# Patient Record
Sex: MALE | Race: WHITE
[De-identification: names, ages, dates, MRNs, and addresses within clinical notes are randomized per-mention and may not be internally consistent; named-entity substitution may affect disease eponyms.]

---

## 2023-07-17 PROBLEM — Z00.00 ENCOUNTER FOR PREVENTIVE HEALTH EXAMINATION: Status: ACTIVE | Noted: 2023-07-17

## 2024-09-03 ENCOUNTER — APPOINTMENT (OUTPATIENT)
Dept: DERMATOLOGY | Facility: CLINIC | Age: 64
End: 2024-09-03
Payer: COMMERCIAL

## 2024-09-03 PROCEDURE — 99203 OFFICE O/P NEW LOW 30 MIN: CPT | Mod: 25

## 2024-09-03 PROCEDURE — 17110 DESTRUCTION B9 LES UP TO 14: CPT

## 2025-07-05 ENCOUNTER — INPATIENT (INPATIENT)
Facility: HOSPITAL | Age: 65
LOS: 1 days | Discharge: HOME | DRG: 189 | End: 2025-07-07
Attending: STUDENT IN AN ORGANIZED HEALTH CARE EDUCATION/TRAINING PROGRAM | Admitting: STUDENT IN AN ORGANIZED HEALTH CARE EDUCATION/TRAINING PROGRAM
Payer: COMMERCIAL

## 2025-07-05 VITALS
OXYGEN SATURATION: 93 % | DIASTOLIC BLOOD PRESSURE: 84 MMHG | TEMPERATURE: 98 F | RESPIRATION RATE: 18 BRPM | HEIGHT: 72 IN | SYSTOLIC BLOOD PRESSURE: 130 MMHG | WEIGHT: 224.65 LBS | HEART RATE: 95 BPM

## 2025-07-05 DIAGNOSIS — J96.01 ACUTE RESPIRATORY FAILURE WITH HYPOXIA: ICD-10-CM

## 2025-07-05 LAB
ALBUMIN SERPL ELPH-MCNC: 4.2 G/DL — SIGNIFICANT CHANGE UP (ref 3.3–5.2)
ALP SERPL-CCNC: 57 U/L — SIGNIFICANT CHANGE UP (ref 40–120)
ALT FLD-CCNC: 31 U/L — SIGNIFICANT CHANGE UP
ANION GAP SERPL CALC-SCNC: 16 MMOL/L — SIGNIFICANT CHANGE UP (ref 5–17)
AST SERPL-CCNC: 23 U/L — SIGNIFICANT CHANGE UP
BILIRUB SERPL-MCNC: 0.8 MG/DL — SIGNIFICANT CHANGE UP (ref 0.4–2)
BUN SERPL-MCNC: 16.6 MG/DL — SIGNIFICANT CHANGE UP (ref 8–20)
CALCIUM SERPL-MCNC: 9.3 MG/DL — SIGNIFICANT CHANGE UP (ref 8.4–10.5)
CHLORIDE SERPL-SCNC: 105 MMOL/L — SIGNIFICANT CHANGE UP (ref 96–108)
CO2 SERPL-SCNC: 21 MMOL/L — LOW (ref 22–29)
CREAT SERPL-MCNC: 0.74 MG/DL — SIGNIFICANT CHANGE UP (ref 0.5–1.3)
D DIMER BLD IA.RAPID-MCNC: <150 NG/ML DDU — SIGNIFICANT CHANGE UP
EGFR: 102 ML/MIN/1.73M2 — SIGNIFICANT CHANGE UP
EGFR: 102 ML/MIN/1.73M2 — SIGNIFICANT CHANGE UP
GAS PNL BLDV: SIGNIFICANT CHANGE UP
GLUCOSE SERPL-MCNC: 92 MG/DL — SIGNIFICANT CHANGE UP (ref 70–99)
HCT VFR BLD CALC: 46 % — SIGNIFICANT CHANGE UP (ref 39–50)
HGB BLD-MCNC: 15.8 G/DL — SIGNIFICANT CHANGE UP (ref 13–17)
MCHC RBC-ENTMCNC: 32 PG — SIGNIFICANT CHANGE UP (ref 27–34)
MCHC RBC-ENTMCNC: 34.3 G/DL — SIGNIFICANT CHANGE UP (ref 32–36)
MCV RBC AUTO: 93.3 FL — SIGNIFICANT CHANGE UP (ref 80–100)
NRBC # BLD AUTO: 0 K/UL — SIGNIFICANT CHANGE UP (ref 0–0)
NRBC # FLD: 0 K/UL — SIGNIFICANT CHANGE UP (ref 0–0)
NRBC BLD AUTO-RTO: 0 /100 WBCS — SIGNIFICANT CHANGE UP (ref 0–0)
NT-PROBNP SERPL-SCNC: <36 PG/ML — SIGNIFICANT CHANGE UP (ref 0–300)
PLATELET # BLD AUTO: 268 K/UL — SIGNIFICANT CHANGE UP (ref 150–400)
PMV BLD: 10.5 FL — SIGNIFICANT CHANGE UP (ref 7–13)
POTASSIUM SERPL-MCNC: 4 MMOL/L — SIGNIFICANT CHANGE UP (ref 3.5–5.3)
POTASSIUM SERPL-SCNC: 4 MMOL/L — SIGNIFICANT CHANGE UP (ref 3.5–5.3)
PROT SERPL-MCNC: 6.9 G/DL — SIGNIFICANT CHANGE UP (ref 6.6–8.7)
RAPID RVP RESULT: SIGNIFICANT CHANGE UP
RBC # BLD: 4.93 M/UL — SIGNIFICANT CHANGE UP (ref 4.2–5.8)
RBC # FLD: 12.8 % — SIGNIFICANT CHANGE UP (ref 10.3–14.5)
SARS-COV-2 RNA SPEC QL NAA+PROBE: SIGNIFICANT CHANGE UP
SODIUM SERPL-SCNC: 142 MMOL/L — SIGNIFICANT CHANGE UP (ref 135–145)
TROPONIN T, HIGH SENSITIVITY RESULT: 11 NG/L — SIGNIFICANT CHANGE UP (ref 0–51)
TROPONIN T, HIGH SENSITIVITY RESULT: 13 NG/L — SIGNIFICANT CHANGE UP (ref 0–51)
WBC # BLD: 8.04 K/UL — SIGNIFICANT CHANGE UP (ref 3.8–10.5)
WBC # FLD AUTO: 8.04 K/UL — SIGNIFICANT CHANGE UP (ref 3.8–10.5)

## 2025-07-05 PROCEDURE — 84295 ASSAY OF SERUM SODIUM: CPT

## 2025-07-05 PROCEDURE — 82435 ASSAY OF BLOOD CHLORIDE: CPT

## 2025-07-05 PROCEDURE — 84132 ASSAY OF SERUM POTASSIUM: CPT

## 2025-07-05 PROCEDURE — 0225U NFCT DS DNA&RNA 21 SARSCOV2: CPT

## 2025-07-05 PROCEDURE — 80053 COMPREHEN METABOLIC PANEL: CPT

## 2025-07-05 PROCEDURE — 85027 COMPLETE CBC AUTOMATED: CPT

## 2025-07-05 PROCEDURE — 85379 FIBRIN DEGRADATION QUANT: CPT

## 2025-07-05 PROCEDURE — 83605 ASSAY OF LACTIC ACID: CPT

## 2025-07-05 PROCEDURE — 94640 AIRWAY INHALATION TREATMENT: CPT

## 2025-07-05 PROCEDURE — 71046 X-RAY EXAM CHEST 2 VIEWS: CPT

## 2025-07-05 PROCEDURE — 99285 EMERGENCY DEPT VISIT HI MDM: CPT

## 2025-07-05 PROCEDURE — 82803 BLOOD GASES ANY COMBINATION: CPT

## 2025-07-05 PROCEDURE — 71275 CT ANGIOGRAPHY CHEST: CPT | Mod: 26

## 2025-07-05 PROCEDURE — 85014 HEMATOCRIT: CPT

## 2025-07-05 PROCEDURE — 36415 COLL VENOUS BLD VENIPUNCTURE: CPT

## 2025-07-05 PROCEDURE — 83880 ASSAY OF NATRIURETIC PEPTIDE: CPT

## 2025-07-05 PROCEDURE — 82330 ASSAY OF CALCIUM: CPT

## 2025-07-05 PROCEDURE — 71275 CT ANGIOGRAPHY CHEST: CPT

## 2025-07-05 PROCEDURE — 71046 X-RAY EXAM CHEST 2 VIEWS: CPT | Mod: 26

## 2025-07-05 PROCEDURE — 85018 HEMOGLOBIN: CPT

## 2025-07-05 PROCEDURE — 84484 ASSAY OF TROPONIN QUANT: CPT

## 2025-07-05 PROCEDURE — 82947 ASSAY GLUCOSE BLOOD QUANT: CPT

## 2025-07-05 PROCEDURE — 99223 1ST HOSP IP/OBS HIGH 75: CPT

## 2025-07-05 RX ORDER — CEFTRIAXONE 500 MG/1
1000 INJECTION, POWDER, FOR SOLUTION INTRAMUSCULAR; INTRAVENOUS ONCE
Refills: 0 | Status: COMPLETED | OUTPATIENT
Start: 2025-07-05 | End: 2025-07-05

## 2025-07-05 RX ORDER — CEFTRIAXONE 500 MG/1
1000 INJECTION, POWDER, FOR SOLUTION INTRAMUSCULAR; INTRAVENOUS ONCE
Refills: 0 | Status: DISCONTINUED | OUTPATIENT
Start: 2025-07-05 | End: 2025-07-05

## 2025-07-05 RX ORDER — MELATONIN 5 MG
3 TABLET ORAL AT BEDTIME
Refills: 0 | Status: DISCONTINUED | OUTPATIENT
Start: 2025-07-05 | End: 2025-07-07

## 2025-07-05 RX ORDER — METHYLPREDNISOLONE ACETATE 80 MG/ML
125 INJECTION, SUSPENSION INTRA-ARTICULAR; INTRALESIONAL; INTRAMUSCULAR; SOFT TISSUE ONCE
Refills: 0 | Status: COMPLETED | OUTPATIENT
Start: 2025-07-05 | End: 2025-07-05

## 2025-07-05 RX ORDER — ONDANSETRON HCL/PF 4 MG/2 ML
4 VIAL (ML) INJECTION EVERY 6 HOURS
Refills: 0 | Status: DISCONTINUED | OUTPATIENT
Start: 2025-07-05 | End: 2025-07-07

## 2025-07-05 RX ORDER — METHYLPREDNISOLONE ACETATE 80 MG/ML
40 INJECTION, SUSPENSION INTRA-ARTICULAR; INTRALESIONAL; INTRAMUSCULAR; SOFT TISSUE EVERY 8 HOURS
Refills: 0 | Status: DISCONTINUED | OUTPATIENT
Start: 2025-07-05 | End: 2025-07-07

## 2025-07-05 RX ORDER — MAGNESIUM, ALUMINUM HYDROXIDE 200-200 MG
30 TABLET,CHEWABLE ORAL EVERY 4 HOURS
Refills: 0 | Status: DISCONTINUED | OUTPATIENT
Start: 2025-07-05 | End: 2025-07-07

## 2025-07-05 RX ORDER — ACETAMINOPHEN 500 MG/5ML
650 LIQUID (ML) ORAL EVERY 6 HOURS
Refills: 0 | Status: DISCONTINUED | OUTPATIENT
Start: 2025-07-05 | End: 2025-07-07

## 2025-07-05 RX ORDER — IPRATROPIUM BROMIDE AND ALBUTEROL SULFATE .5; 2.5 MG/3ML; MG/3ML
3 SOLUTION RESPIRATORY (INHALATION) EVERY 6 HOURS
Refills: 0 | Status: DISCONTINUED | OUTPATIENT
Start: 2025-07-05 | End: 2025-07-05

## 2025-07-05 RX ORDER — IPRATROPIUM BROMIDE AND ALBUTEROL SULFATE .5; 2.5 MG/3ML; MG/3ML
3 SOLUTION RESPIRATORY (INHALATION) EVERY 6 HOURS
Refills: 0 | Status: DISCONTINUED | OUTPATIENT
Start: 2025-07-05 | End: 2025-07-07

## 2025-07-05 RX ORDER — AZITHROMYCIN 250 MG
500 CAPSULE ORAL ONCE
Refills: 0 | Status: COMPLETED | OUTPATIENT
Start: 2025-07-05 | End: 2025-07-05

## 2025-07-05 RX ADMIN — CEFTRIAXONE 1000 MILLIGRAM(S): 500 INJECTION, POWDER, FOR SOLUTION INTRAMUSCULAR; INTRAVENOUS at 20:55

## 2025-07-05 RX ADMIN — IPRATROPIUM BROMIDE AND ALBUTEROL SULFATE 3 MILLILITER(S): .5; 2.5 SOLUTION RESPIRATORY (INHALATION) at 15:52

## 2025-07-05 RX ADMIN — Medication 250 MILLIGRAM(S): at 20:56

## 2025-07-05 RX ADMIN — METHYLPREDNISOLONE ACETATE 125 MILLIGRAM(S): 80 INJECTION, SUSPENSION INTRA-ARTICULAR; INTRALESIONAL; INTRAMUSCULAR; SOFT TISSUE at 15:48

## 2025-07-05 NOTE — ED PROVIDER NOTE - PHYSICAL EXAMINATION
GENERAL: no acute distress, comfortably in bed  HEENT: Atraumatic, normocephalic, non-icteric, no JVD  NEURO:  A&Ox3, no focal deficits, moving all extremities spontaneously, no dysarthria, CN II-XII grossly intact  PSYCH: Normal affect, calm, appropriate insight and judgment, fluent speech  LUNGS:wheezinfg bl no distress   HEART: RRR, no murmur appreciated  ABD: Soft, non-tender, non-distended, no organomegaly, no appreciable masses, +bs all 4 quadrants  EXTREMITIES: Nontender, no clubbing, cyanosis, or edema  SKIN: No rashes or lesions
no

## 2025-07-05 NOTE — H&P ADULT - HISTORY OF PRESENT ILLNESS
65yoM hx former smoker, early stage bladder cancer, HTN, HLD presenting with 1.5 weeks of exertional dyspnea, productive cough and audible wheezing.  Pt was recently diagnosed with a hMPV as outpatient which coincide with onset of symptoms. He was started on budesonide nebulizer by an outpatient doctor which offered temporary relief but came to ED as symptoms were persistent.  Pt was not given any oral steroids or antibiotics as outpatient.  He denies hx of any chronic lung diseases, never seen a pulmonologist or had any PFTs done.  On admission, pt hypoxic to 86% on room air and placed on nasal cannula with improvement.    65yoM hx former smoker, early stage bladder cancer, HTN, HLD presenting with 1.5 weeks of exertional dyspnea, productive cough and audible wheezing.  Pt was recently diagnosed with a hMPV as outpatient which coincided with onset of symptoms. He was started on budesonide nebulizer by an outpatient doctor which offered temporary relief but came to ED as symptoms were persistent.  Pt was not given any oral steroids or antibiotics by outpatient doctor.  He denies hx of any chronic lung diseases, never seen a pulmonologist or had any PFTs done.  On admission, pt hypoxic to 86% on room air and placed on nasal cannula with improvement.

## 2025-07-05 NOTE — ED PROVIDER NOTE - CLINICAL SUMMARY MEDICAL DECISION MAKING FREE TEXT BOX
pt with wheezing sob for 2 days recently treated for pna hypoxic on room air to be admiited to medicine

## 2025-07-05 NOTE — H&P ADULT - CONVERSATION DETAILS
Pt w/ early bladder cancer diagnosis, currently undergoing close follow up with urologist with cautery of lesions as needed.  Pt states he wants aggressive medical management and resuscitative measures in event of cardiac arrest. Pt is full code.

## 2025-07-05 NOTE — H&P ADULT - TIME BILLING
chart review, patient encounter, chart documentation, GOC discussion.  Plan discussed with patient and floor RN.

## 2025-07-05 NOTE — H&P ADULT - NSICDXPASTMEDICALHX_GEN_ALL_CORE_FT
PAST MEDICAL HISTORY:  Bladder cancer     Former cigarette smoker     Hyperlipidemia     Hypertension

## 2025-07-05 NOTE — H&P ADULT - NSHPPOAPULMEMBOLUS_GEN_A_CORE
Procedure:  COLONOSCOPY    Relevant Problems   No relevant active problems        Physical Exam    Airway    Mallampati score: II  TM Distance: >3 FB  Neck ROM: full     Dental       Cardiovascular  Rhythm: regular, Rate: normal, Cardiovascular exam normal    Pulmonary  Pulmonary exam normal Breath sounds clear to auscultation,     Other Findings        Anesthesia Plan  ASA Score- 1     Anesthesia Type- IV sedation with anesthesia with ASA Monitors  Additional Monitors:   Airway Plan:           Plan Factors-Exercise tolerance (METS): >4 METS  Chart reviewed  Patient summary reviewed  Patient is not a current smoker  Induction-     Postoperative Plan-     Informed Consent- Anesthetic plan and risks discussed with patient  no

## 2025-07-05 NOTE — ED ADULT TRIAGE NOTE - CHIEF COMPLAINT QUOTE
pt c/o SOB, DX bronchitis a few days ago, got worse last week, maybe PNA  A&Ox3, resp wnl, denies fever

## 2025-07-05 NOTE — H&P ADULT - NSHPLABSRESULTS_GEN_ALL_CORE
07-06    139  |  104  |  18.1  ----------------------------<  142[H]  3.9   |  20.0[L]  |  0.74    Ca    9.2      06 Jul 2025 02:00    TPro  7.2  /  Alb  4.5  /  TBili  0.6  /  DBili  x   /  AST  19  /  ALT  30  /  AlkPhos  55  07-06                            15.5   10.21 )-----------( 272      ( 06 Jul 2025 02:00 )             44.7

## 2025-07-05 NOTE — H&P ADULT - ASSESSMENT
65yoM hx former smoker, early stage bladder cancer, HTN, HLD presenting with several days of exertional dyspnea, productive cough and audible wheezing after he was diagnosed with recent hMPV as outpatient     Acute hypoxemic respiratory failure  Suspected bronchitis from recent viral infection +/- probable COPD exacerbation   -CT chest reviewed, no consolidation, but bronchial thickening noted  -s/p ceftriaxone, azithromycin by ED  -Continue CAP therapy while awaiting inflammatory markers, urine Legionella + Strep  -Continue IV steroids and standing DuoNebs  -Obtain MRSA swab, blood cultures  -On 3L nasal cannula  -Admit to , wean supplemental O2 as tolerated   -Needs outpatient pulmonary follow up for PFTs    Hx HTN, HLD  -Resume amlodipine, interchange Olmesartan w/ losartan  -Resume rosuvastatin     Hx bladder cancer  -Reports ‘stage 0’ bladder cancer, on surveillance with cautery of lesions  -Outpatient urology follow up    Hx adrenal lesions  -Incidental finding on CT chest but pt states has been following for 4-5 years w/ {<D    Hx nephrolithiasis  -Bilateral non-obstructing renal calculi on CT, pt aware, follows w/ urologist     Hx obesity  -On Zepbound weekly for weight loss, hold in hospital     Prophylactic measure  -Lovenox 40mg daily (higher risk due to malignancy)        Dispo: medically active, pending clinical improvement, wean off supplemental O2.  Estimated LOS: 2-3 days, likely d/c to home.

## 2025-07-05 NOTE — ED ADULT NURSE REASSESSMENT NOTE - NS ED NURSE REASSESS COMMENT FT1
Assumed care of patient at 1930, resting on stretcher in no acute distress.  O2 via N/C in progress with v/s as charted, with MD aware.  Pt is admitted to tele/ under medicine service and currently awaiting bed.  No complaints voiced, no adverse reactions to antibiotics noted.

## 2025-07-05 NOTE — H&P ADULT - NSHPPHYSICALEXAM_GEN_ALL_CORE
Vital Signs Last 24 Hrs  T(C): 36.3 (06 Jul 2025 05:00), Max: 37.1 (05 Jul 2025 15:14)  T(F): 97.4 (06 Jul 2025 05:00), Max: 98.8 (05 Jul 2025 22:50)  HR: 104 (06 Jul 2025 05:00) (95 - 119)  BP: 108/66 (06 Jul 2025 05:00) (101/60 - 164/93)  BP(mean): 102 (05 Jul 2025 23:14) (102 - 106)  RR: 18 (06 Jul 2025 05:00) (16 - 23)  SpO2: 93% (06 Jul 2025 05:00) (86% - 95%)    Parameters below as of 06 Jul 2025 05:00  Patient On (Oxygen Delivery Method): nasal cannula  O2 Flow (L/min): 3    GENERAL:  Well-appearing, not in acute distress, wearing nasal cannula  EYES:  Clear conjunctiva, extraocular movement intact  ENT: Moist mucous membranes  RESP:  Non-labored breathing pattern, bilateral wheezing, rhonchi  CV: Regular rate and rhythm, no murmurs appreciated, no lower extremity edema  GI: Soft, non-tender, non-distended  NEURO: Awake, alert, conversant, upper and lower extremity strength 5/5, light touch sensation grossly intact  PSYCH: Calm, cooperative  SKIN: No rash or lesions, warm and dry

## 2025-07-05 NOTE — ED ADULT NURSE NOTE - OBJECTIVE STATEMENT
Pt is a 65y M, AOx4, c/o SOB and productive cough. Pt states he was dx w/bronchitis by PCP x2 weeks ago, has been on azithromycin w/no relief of sx. Pt has prior Xray from PCP. No hx COPD, hx smoking, not currently smoker. Pt denies chest pain, abd pain, back pain, diaphoresis, headaches, dizziness, lightheadedness, fevers, chills, n/v/d, constipation and dysuria. PMH HTN. WOB noted, pt unable to speak full sentences without becoming exhausted, o2 sat at 95% on RA, airway patent. Pt remains on tele monitor in NSR w/. Bed locked and in lowest position.

## 2025-07-06 DIAGNOSIS — Z90.89 ACQUIRED ABSENCE OF OTHER ORGANS: Chronic | ICD-10-CM

## 2025-07-06 DIAGNOSIS — Z98.890 OTHER SPECIFIED POSTPROCEDURAL STATES: Chronic | ICD-10-CM

## 2025-07-06 LAB
ALBUMIN SERPL ELPH-MCNC: 4.5 G/DL — SIGNIFICANT CHANGE UP (ref 3.3–5.2)
ALP SERPL-CCNC: 55 U/L — SIGNIFICANT CHANGE UP (ref 40–120)
ALT FLD-CCNC: 30 U/L — SIGNIFICANT CHANGE UP
ANION GAP SERPL CALC-SCNC: 16 MMOL/L — SIGNIFICANT CHANGE UP (ref 5–17)
AST SERPL-CCNC: 19 U/L — SIGNIFICANT CHANGE UP
BASOPHILS # BLD AUTO: 0.02 K/UL — SIGNIFICANT CHANGE UP (ref 0–0.2)
BASOPHILS NFR BLD AUTO: 0.2 % — SIGNIFICANT CHANGE UP (ref 0–2)
BILIRUB SERPL-MCNC: 0.6 MG/DL — SIGNIFICANT CHANGE UP (ref 0.4–2)
BUN SERPL-MCNC: 18.1 MG/DL — SIGNIFICANT CHANGE UP (ref 8–20)
CALCIUM SERPL-MCNC: 9.2 MG/DL — SIGNIFICANT CHANGE UP (ref 8.4–10.5)
CHLORIDE SERPL-SCNC: 104 MMOL/L — SIGNIFICANT CHANGE UP (ref 96–108)
CO2 SERPL-SCNC: 20 MMOL/L — LOW (ref 22–29)
CREAT SERPL-MCNC: 0.74 MG/DL — SIGNIFICANT CHANGE UP (ref 0.5–1.3)
CRP SERPL-MCNC: <4 MG/L — SIGNIFICANT CHANGE UP
EGFR: 102 ML/MIN/1.73M2 — SIGNIFICANT CHANGE UP
EGFR: 102 ML/MIN/1.73M2 — SIGNIFICANT CHANGE UP
EOSINOPHIL # BLD AUTO: 0 K/UL — SIGNIFICANT CHANGE UP (ref 0–0.5)
EOSINOPHIL NFR BLD AUTO: 0 % — SIGNIFICANT CHANGE UP (ref 0–6)
GLUCOSE SERPL-MCNC: 142 MG/DL — HIGH (ref 70–99)
GRAM STN FLD: ABNORMAL
HCT VFR BLD CALC: 44.7 % — SIGNIFICANT CHANGE UP (ref 39–50)
HGB BLD-MCNC: 15.5 G/DL — SIGNIFICANT CHANGE UP (ref 13–17)
IMM GRANULOCYTES # BLD AUTO: 0.04 K/UL — SIGNIFICANT CHANGE UP (ref 0–0.07)
IMM GRANULOCYTES NFR BLD AUTO: 0.4 % — SIGNIFICANT CHANGE UP (ref 0–0.9)
LEGIONELLA AG UR QL: NEGATIVE — SIGNIFICANT CHANGE UP
LYMPHOCYTES # BLD AUTO: 0.89 K/UL — LOW (ref 1–3.3)
LYMPHOCYTES NFR BLD AUTO: 8.7 % — LOW (ref 13–44)
MCHC RBC-ENTMCNC: 31.9 PG — SIGNIFICANT CHANGE UP (ref 27–34)
MCHC RBC-ENTMCNC: 34.7 G/DL — SIGNIFICANT CHANGE UP (ref 32–36)
MCV RBC AUTO: 92 FL — SIGNIFICANT CHANGE UP (ref 80–100)
MONOCYTES # BLD AUTO: 0.13 K/UL — SIGNIFICANT CHANGE UP (ref 0–0.9)
MONOCYTES NFR BLD AUTO: 1.3 % — LOW (ref 2–14)
MRSA PCR RESULT.: SIGNIFICANT CHANGE UP
NEUTROPHILS # BLD AUTO: 9.13 K/UL — HIGH (ref 1.8–7.4)
NEUTROPHILS NFR BLD AUTO: 89.4 % — HIGH (ref 43–77)
NRBC # BLD AUTO: 0 K/UL — SIGNIFICANT CHANGE UP (ref 0–0)
NRBC # FLD: 0 K/UL — SIGNIFICANT CHANGE UP (ref 0–0)
NRBC BLD AUTO-RTO: 0 /100 WBCS — SIGNIFICANT CHANGE UP (ref 0–0)
PLATELET # BLD AUTO: 272 K/UL — SIGNIFICANT CHANGE UP (ref 150–400)
PMV BLD: 10.1 FL — SIGNIFICANT CHANGE UP (ref 7–13)
POTASSIUM SERPL-MCNC: 3.9 MMOL/L — SIGNIFICANT CHANGE UP (ref 3.5–5.3)
POTASSIUM SERPL-SCNC: 3.9 MMOL/L — SIGNIFICANT CHANGE UP (ref 3.5–5.3)
PROCALCITONIN SERPL-MCNC: 0.05 NG/ML — SIGNIFICANT CHANGE UP (ref 0.02–0.1)
PROT SERPL-MCNC: 7.2 G/DL — SIGNIFICANT CHANGE UP (ref 6.6–8.7)
RBC # BLD: 4.86 M/UL — SIGNIFICANT CHANGE UP (ref 4.2–5.8)
RBC # FLD: 12.4 % — SIGNIFICANT CHANGE UP (ref 10.3–14.5)
S AUREUS DNA NOSE QL NAA+PROBE: SIGNIFICANT CHANGE UP
S PNEUM AG UR QL: NEGATIVE — SIGNIFICANT CHANGE UP
SODIUM SERPL-SCNC: 139 MMOL/L — SIGNIFICANT CHANGE UP (ref 135–145)
SPECIMEN SOURCE: SIGNIFICANT CHANGE UP
WBC # BLD: 10.21 K/UL — SIGNIFICANT CHANGE UP (ref 3.8–10.5)
WBC # FLD AUTO: 10.21 K/UL — SIGNIFICANT CHANGE UP (ref 3.8–10.5)

## 2025-07-06 PROCEDURE — 87641 MR-STAPH DNA AMP PROBE: CPT

## 2025-07-06 PROCEDURE — 71046 X-RAY EXAM CHEST 2 VIEWS: CPT

## 2025-07-06 PROCEDURE — 82947 ASSAY GLUCOSE BLOOD QUANT: CPT

## 2025-07-06 PROCEDURE — 84132 ASSAY OF SERUM POTASSIUM: CPT

## 2025-07-06 PROCEDURE — 85025 COMPLETE CBC W/AUTO DIFF WBC: CPT

## 2025-07-06 PROCEDURE — 94640 AIRWAY INHALATION TREATMENT: CPT

## 2025-07-06 PROCEDURE — 87899 AGENT NOS ASSAY W/OPTIC: CPT

## 2025-07-06 PROCEDURE — 85018 HEMOGLOBIN: CPT

## 2025-07-06 PROCEDURE — 80053 COMPREHEN METABOLIC PANEL: CPT

## 2025-07-06 PROCEDURE — 87640 STAPH A DNA AMP PROBE: CPT

## 2025-07-06 PROCEDURE — 85027 COMPLETE CBC AUTOMATED: CPT

## 2025-07-06 PROCEDURE — 87070 CULTURE OTHR SPECIMN AEROBIC: CPT

## 2025-07-06 PROCEDURE — 71275 CT ANGIOGRAPHY CHEST: CPT

## 2025-07-06 PROCEDURE — 36415 COLL VENOUS BLD VENIPUNCTURE: CPT

## 2025-07-06 PROCEDURE — 82803 BLOOD GASES ANY COMBINATION: CPT

## 2025-07-06 PROCEDURE — 0225U NFCT DS DNA&RNA 21 SARSCOV2: CPT

## 2025-07-06 PROCEDURE — 84295 ASSAY OF SERUM SODIUM: CPT

## 2025-07-06 PROCEDURE — 82435 ASSAY OF BLOOD CHLORIDE: CPT

## 2025-07-06 PROCEDURE — 99233 SBSQ HOSP IP/OBS HIGH 50: CPT

## 2025-07-06 PROCEDURE — 85014 HEMATOCRIT: CPT

## 2025-07-06 PROCEDURE — 85379 FIBRIN DEGRADATION QUANT: CPT

## 2025-07-06 PROCEDURE — 84145 PROCALCITONIN (PCT): CPT

## 2025-07-06 PROCEDURE — 87040 BLOOD CULTURE FOR BACTERIA: CPT

## 2025-07-06 PROCEDURE — 86140 C-REACTIVE PROTEIN: CPT

## 2025-07-06 PROCEDURE — 83880 ASSAY OF NATRIURETIC PEPTIDE: CPT

## 2025-07-06 PROCEDURE — 82330 ASSAY OF CALCIUM: CPT

## 2025-07-06 PROCEDURE — 84484 ASSAY OF TROPONIN QUANT: CPT

## 2025-07-06 PROCEDURE — 83605 ASSAY OF LACTIC ACID: CPT

## 2025-07-06 RX ORDER — ROSUVASTATIN CALCIUM 20 MG/1
20 TABLET, FILM COATED ORAL AT BEDTIME
Refills: 0 | Status: DISCONTINUED | OUTPATIENT
Start: 2025-07-06 | End: 2025-07-07

## 2025-07-06 RX ORDER — DEXTROMETHORPHAN HBR, GUAIFENESIN 200 MG/10ML
100 LIQUID ORAL EVERY 6 HOURS
Refills: 0 | Status: DISCONTINUED | OUTPATIENT
Start: 2025-07-06 | End: 2025-07-07

## 2025-07-06 RX ORDER — BUDESONIDE 0.25 MG/2ML
2 SUSPENSION RESPIRATORY (INHALATION)
Refills: 0 | DISCHARGE

## 2025-07-06 RX ORDER — CEFTRIAXONE 500 MG/1
1000 INJECTION, POWDER, FOR SOLUTION INTRAMUSCULAR; INTRAVENOUS EVERY 24 HOURS
Refills: 0 | Status: DISCONTINUED | OUTPATIENT
Start: 2025-07-06 | End: 2025-07-06

## 2025-07-06 RX ORDER — OLMESARTAN MEDOXOMIL 5 MG/1
1 TABLET, FILM COATED ORAL
Refills: 0 | DISCHARGE

## 2025-07-06 RX ORDER — AZITHROMYCIN 250 MG
500 CAPSULE ORAL EVERY 24 HOURS
Refills: 0 | Status: DISCONTINUED | OUTPATIENT
Start: 2025-07-06 | End: 2025-07-07

## 2025-07-06 RX ORDER — LOSARTAN POTASSIUM 100 MG/1
100 TABLET, FILM COATED ORAL DAILY
Refills: 0 | Status: DISCONTINUED | OUTPATIENT
Start: 2025-07-06 | End: 2025-07-07

## 2025-07-06 RX ORDER — AMLODIPINE BESYLATE 10 MG/1
1 TABLET ORAL
Refills: 0 | DISCHARGE

## 2025-07-06 RX ORDER — AMLODIPINE BESYLATE 10 MG/1
2.5 TABLET ORAL DAILY
Refills: 0 | Status: DISCONTINUED | OUTPATIENT
Start: 2025-07-06 | End: 2025-07-07

## 2025-07-06 RX ORDER — ENOXAPARIN SODIUM 100 MG/ML
40 INJECTION SUBCUTANEOUS EVERY 24 HOURS
Refills: 0 | Status: DISCONTINUED | OUTPATIENT
Start: 2025-07-06 | End: 2025-07-07

## 2025-07-06 RX ORDER — DEXTROMETHORPHAN HBR, GUAIFENESIN 200 MG/10ML
1200 LIQUID ORAL EVERY 12 HOURS
Refills: 0 | Status: DISCONTINUED | OUTPATIENT
Start: 2025-07-06 | End: 2025-07-07

## 2025-07-06 RX ADMIN — DEXTROMETHORPHAN HBR, GUAIFENESIN 1200 MILLIGRAM(S): 200 LIQUID ORAL at 17:01

## 2025-07-06 RX ADMIN — ENOXAPARIN SODIUM 40 MILLIGRAM(S): 100 INJECTION SUBCUTANEOUS at 05:14

## 2025-07-06 RX ADMIN — METHYLPREDNISOLONE ACETATE 40 MILLIGRAM(S): 80 INJECTION, SUSPENSION INTRA-ARTICULAR; INTRALESIONAL; INTRAMUSCULAR; SOFT TISSUE at 05:15

## 2025-07-06 RX ADMIN — IPRATROPIUM BROMIDE AND ALBUTEROL SULFATE 3 MILLILITER(S): .5; 2.5 SOLUTION RESPIRATORY (INHALATION) at 15:30

## 2025-07-06 RX ADMIN — METHYLPREDNISOLONE ACETATE 40 MILLIGRAM(S): 80 INJECTION, SUSPENSION INTRA-ARTICULAR; INTRALESIONAL; INTRAMUSCULAR; SOFT TISSUE at 22:07

## 2025-07-06 RX ADMIN — METHYLPREDNISOLONE ACETATE 40 MILLIGRAM(S): 80 INJECTION, SUSPENSION INTRA-ARTICULAR; INTRALESIONAL; INTRAMUSCULAR; SOFT TISSUE at 13:41

## 2025-07-06 RX ADMIN — AMLODIPINE BESYLATE 2.5 MILLIGRAM(S): 10 TABLET ORAL at 05:15

## 2025-07-06 RX ADMIN — LOSARTAN POTASSIUM 100 MILLIGRAM(S): 100 TABLET, FILM COATED ORAL at 05:15

## 2025-07-06 RX ADMIN — ROSUVASTATIN CALCIUM 20 MILLIGRAM(S): 20 TABLET, FILM COATED ORAL at 22:07

## 2025-07-06 RX ADMIN — Medication 250 MILLIGRAM(S): at 05:15

## 2025-07-06 RX ADMIN — IPRATROPIUM BROMIDE AND ALBUTEROL SULFATE 3 MILLILITER(S): .5; 2.5 SOLUTION RESPIRATORY (INHALATION) at 03:06

## 2025-07-06 RX ADMIN — Medication 40 MILLIGRAM(S): at 05:17

## 2025-07-06 RX ADMIN — IPRATROPIUM BROMIDE AND ALBUTEROL SULFATE 3 MILLILITER(S): .5; 2.5 SOLUTION RESPIRATORY (INHALATION) at 22:49

## 2025-07-06 RX ADMIN — DEXTROMETHORPHAN HBR, GUAIFENESIN 100 MILLIGRAM(S): 200 LIQUID ORAL at 23:30

## 2025-07-06 RX ADMIN — CEFTRIAXONE 1000 MILLIGRAM(S): 500 INJECTION, POWDER, FOR SOLUTION INTRAMUSCULAR; INTRAVENOUS at 05:15

## 2025-07-06 RX ADMIN — IPRATROPIUM BROMIDE AND ALBUTEROL SULFATE 3 MILLILITER(S): .5; 2.5 SOLUTION RESPIRATORY (INHALATION) at 09:31

## 2025-07-06 NOTE — PROGRESS NOTE ADULT - SUBJECTIVE AND OBJECTIVE BOX
Patient is a 65y old male who presents with a chief complaint of Acute hypoxemic respiratory failure  Acute bronchitis, suspected COPD exacerbation (05 Jul 2025 22:47)    BRIEF HOSPITAL COURSE:  Patient is a 65 year old male with a PMHx of former smoker, early stage bladder cancer, HTN, and HLD. Pt presented to the ED on 7/5 afternoon with c/o 1.5 weeks of exertional dyspnea, productive cough, audible wheezing, and mild headache. Pt was seen outpatient and diagnosed with human metapneumovirus approximately 1.5 weeks ago which coincided with the onset of his symptoms. He was started on budesonide nebulizer by an outpatient provider which offered temporary relief. He came to the ED due to persistent symptoms. He denies hx of any chronic lung diseases and has never seen a pulmonologist or had any PFTs done.  On admission, patient was found to be hypoxic with an oxygen saturation of 86% on room air. He was placed on nasal cannula and showed improvement.     Chest X-ray on 7/5 was negative for active chest disease,     CT Chest on 7/5 showed b/l lower lobe bronchial wall thickening with areas of mucoid impaction and, to a lesser degree, in the right middle lobe. Incidental findings of b/l non-obstructing renal calculi and a 3.3 cm indeterminate left adrenal gland nodule.       INTERVAL HPI/OVERNIGHT EVENTS:  - No acute overnight events  - Patient is comfortable    TODAY:  - Patient examined bedside, comfortable, no complaints  - Patient denies CP, SOB, fever, nausea, vomiting  - Patient states that he does not feel a difference with or without the nasal cannula  - Patient is occasionally coughing, productive with light yellow phlegm     MEDICATIONS  (STANDING):  albuterol/ipratropium for Nebulization 3 milliLiter(s) Nebulizer every 6 hours  amLODIPine   Tablet 2.5 milliGRAM(s) Oral daily  azithromycin  IVPB 500 milliGRAM(s) IV Intermittent every 24 hours  enoxaparin Injectable 40 milliGRAM(s) SubCutaneous every 24 hours  guaiFENesin ER 1200 milliGRAM(s) Oral every 12 hours  losartan 100 milliGRAM(s) Oral daily  methylPREDNISolone sodium succinate Injectable 40 milliGRAM(s) IV Push every 8 hours  pantoprazole    Tablet 40 milliGRAM(s) Oral before breakfast  rosuvastatin 20 milliGRAM(s) Oral at bedtime    MEDICATIONS  (PRN):  acetaminophen     Tablet .. 650 milliGRAM(s) Oral every 6 hours PRN Temp greater or equal to 38C (100.4F), Mild Pain (1 - 3), Moderate Pain (4 - 6)  aluminum hydroxide/magnesium hydroxide/simethicone Suspension 30 milliLiter(s) Oral every 4 hours PRN Dyspepsia  guaiFENesin Oral Liquid (Sugar-Free) 100 milliGRAM(s) Oral every 6 hours PRN Cough  hydrocodone/homatropine Syrup 5 milliLiter(s) Oral every 6 hours PRN Cough  melatonin 3 milliGRAM(s) Oral at bedtime PRN Insomnia  ondansetron Injectable 4 milliGRAM(s) IV Push every 6 hours PRN Nausea and/or Vomiting      Allergies    venlafaxine (Other)    Intolerances        REVIEW OF SYSTEMS:  as above      Vital Signs Last 24 Hrs  T(C): 36.3 (06 Jul 2025 07:14), Max: 37.1 (05 Jul 2025 15:14)  T(F): 97.3 (06 Jul 2025 07:14), Max: 98.8 (05 Jul 2025 22:50)  HR: 101 (06 Jul 2025 09:37) (95 - 119)  BP: 123/77 (06 Jul 2025 07:14) (101/60 - 164/93)  BP(mean): 102 (05 Jul 2025 23:14) (102 - 106)  RR: 19 (06 Jul 2025 09:37) (16 - 23)  SpO2: 94% (06 Jul 2025 09:37) (86% - 95%)    Parameters below as of 06 Jul 2025 09:37  Patient On (Oxygen Delivery Method): nasal cannula  O2 Flow (L/min): 3      PHYSICAL EXAM:  GENERAL: Not in acute distress, lying comfortably  HEAD:  Atraumatic, Normocephalic  EYES: EOMI, PERRLA, conjunctiva and sclera clear  NECK: Supple, No JVD, Normal thyroid  NERVOUS SYSTEM:  Alert & Oriented X3, No gross focal deficits  CHEST/LUNG: Wheezing bilaterally, primarily in lower lobes; No rales, rhonchi, or rubs  HEART: Regular rate and rhythm; No murmurs, rubs, or gallops  ABDOMEN: Soft, Nontender, Nondistended; Bowel sounds present  EXTREMITIES:  No clubbing, cyanosis, or edema  SKIN: No rashes or lesions    LABS:                        15.5   10.21 )-----------( 272      ( 06 Jul 2025 02:00 )             44.7     07-06    139  |  104  |  18.1  ----------------------------<  142[H]  3.9   |  20.0[L]  |  0.74    Ca    9.2      06 Jul 2025 02:00    TPro  7.2  /  Alb  4.5  /  TBili  0.6  /  DBili  x   /  AST  19  /  ALT  30  /  AlkPhos  55  07-06      Urinalysis Basic - ( 06 Jul 2025 02:00 )    Color: x / Appearance: x / SG: x / pH: x  Gluc: 142 mg/dL / Ketone: x  / Bili: x / Urobili: x   Blood: x / Protein: x / Nitrite: x   Leuk Esterase: x / RBC: x / WBC x   Sq Epi: x / Non Sq Epi: x / Bacteria: x      CAPILLARY BLOOD GLUCOSE          RADIOLOGY & ADDITIONAL TESTS:    Imaging Personally Reviewed:  [X] YES  [ ] NO    Consultant(s) Notes Reviewed:  [X] YES  [ ] NO    Care Discussed with Consultants/Other Providers [X] YES  [ ] NO    Plan of Care discussed with House Staff: [X]YES [ ] NO Patient is a 65y old male who presents with a chief complaint of acute hypoxemic respiratory failure  Acute bronchitis, suspected COPD exacerbation (05 Jul 2025 22:47)    BRIEF HOSPITAL COURSE:  Patient is a 65 year old male with a PMHx of former smoker, early stage bladder cancer, HTN, and HLD. Pt presented to the ED on 7/5 afternoon with c/o 1.5 weeks of exertional dyspnea, productive cough, audible wheezing, and mild headache. Pt was seen outpatient and diagnosed with human metapneumovirus approximately 1.5 weeks ago which coincided with the onset of his symptoms. He was started on budesonide nebulizer by an outpatient provider which offered temporary relief. He came to the ED due to persistent symptoms. He denies hx of any chronic lung diseases and has never seen a pulmonologist or had any PFTs done.  On admission, patient was found to be hypoxic with an oxygen saturation of 86% on room air. He was placed on nasal cannula and showed improvement.     Chest X-ray on 7/5 was negative for active chest disease.    CT Chest on 7/5 showed b/l lower lobe bronchial wall thickening with areas of mucoid impaction and, to a lesser degree, in the right middle lobe. Incidental findings of b/l non-obstructing renal calculi and a 3.3 cm indeterminate left adrenal gland nodule.       INTERVAL HPI/OVERNIGHT EVENTS:  - No acute overnight events  - Patient is comfortable    TODAY:  - Patient examined bedside, comfortable, no complaints  - Patient denies CP, SOB, fever, nausea, vomiting  - Patient states that he does not feel a difference with or without the nasal cannula  - Patient is occasionally coughing, productive with light yellow phlegm     MEDICATIONS  (STANDING):  albuterol/ipratropium for Nebulization 3 milliLiter(s) Nebulizer every 6 hours  amLODIPine   Tablet 2.5 milliGRAM(s) Oral daily  azithromycin  IVPB 500 milliGRAM(s) IV Intermittent every 24 hours  enoxaparin Injectable 40 milliGRAM(s) SubCutaneous every 24 hours  guaiFENesin ER 1200 milliGRAM(s) Oral every 12 hours  losartan 100 milliGRAM(s) Oral daily  methylPREDNISolone sodium succinate Injectable 40 milliGRAM(s) IV Push every 8 hours  pantoprazole    Tablet 40 milliGRAM(s) Oral before breakfast  rosuvastatin 20 milliGRAM(s) Oral at bedtime    MEDICATIONS  (PRN):  acetaminophen     Tablet .. 650 milliGRAM(s) Oral every 6 hours PRN Temp greater or equal to 38C (100.4F), Mild Pain (1 - 3), Moderate Pain (4 - 6)  aluminum hydroxide/magnesium hydroxide/simethicone Suspension 30 milliLiter(s) Oral every 4 hours PRN Dyspepsia  guaiFENesin Oral Liquid (Sugar-Free) 100 milliGRAM(s) Oral every 6 hours PRN Cough  hydrocodone/homatropine Syrup 5 milliLiter(s) Oral every 6 hours PRN Cough  melatonin 3 milliGRAM(s) Oral at bedtime PRN Insomnia  ondansetron Injectable 4 milliGRAM(s) IV Push every 6 hours PRN Nausea and/or Vomiting      Allergies    venlafaxine (Other)    Intolerances        REVIEW OF SYSTEMS:  as above      Vital Signs Last 24 Hrs  T(C): 36.3 (06 Jul 2025 07:14), Max: 37.1 (05 Jul 2025 15:14)  T(F): 97.3 (06 Jul 2025 07:14), Max: 98.8 (05 Jul 2025 22:50)  HR: 101 (06 Jul 2025 09:37) (95 - 119)  BP: 123/77 (06 Jul 2025 07:14) (101/60 - 164/93)  BP(mean): 102 (05 Jul 2025 23:14) (102 - 106)  RR: 19 (06 Jul 2025 09:37) (16 - 23)  SpO2: 94% (06 Jul 2025 09:37) (86% - 95%)    Parameters below as of 06 Jul 2025 09:37  Patient On (Oxygen Delivery Method): nasal cannula  O2 Flow (L/min): 3      PHYSICAL EXAM:  GENERAL: Not in acute distress, lying comfortably  HEAD:  Atraumatic, Normocephalic  EYES: EOMI, PERRLA, conjunctiva and sclera clear  NECK: Supple, No JVD, Normal thyroid  NERVOUS SYSTEM:  Alert & Oriented X3, No gross focal deficits  CHEST/LUNG: Wheezing bilaterally, primarily in lower lobes; No rales, rhonchi, or rubs  HEART: Regular rate and rhythm; No murmurs, rubs, or gallops  ABDOMEN: Soft, Nontender, Nondistended; Bowel sounds present  EXTREMITIES:  No clubbing, cyanosis, or edema  SKIN: No rashes or lesions    LABS:                        15.5   10.21 )-----------( 272      ( 06 Jul 2025 02:00 )             44.7     07-06    139  |  104  |  18.1  ----------------------------<  142[H]  3.9   |  20.0[L]  |  0.74    Ca    9.2      06 Jul 2025 02:00    TPro  7.2  /  Alb  4.5  /  TBili  0.6  /  DBili  x   /  AST  19  /  ALT  30  /  AlkPhos  55  07-06      Urinalysis Basic - ( 06 Jul 2025 02:00 )    Color: x / Appearance: x / SG: x / pH: x  Gluc: 142 mg/dL / Ketone: x  / Bili: x / Urobili: x   Blood: x / Protein: x / Nitrite: x   Leuk Esterase: x / RBC: x / WBC x   Sq Epi: x / Non Sq Epi: x / Bacteria: x      CAPILLARY BLOOD GLUCOSE          RADIOLOGY & ADDITIONAL TESTS:    Imaging Personally Reviewed:  [X] YES  [ ] NO    Consultant(s) Notes Reviewed:  [X] YES  [ ] NO    Care Discussed with Consultants/Other Providers [X] YES  [ ] NO    Plan of Care discussed with House Staff: [X]YES [ ] NO Patient is a 65y old male who presents with a chief complaint of acute hypoxemic respiratory failure and acute bronchitis post-viral infection (05 Jul 2025 22:47)    BRIEF HOSPITAL COURSE:  Patient is a 65 year old male with a PMHx of former smoker, early stage bladder cancer, HTN, and HLD. Pt presented to the ED on 7/5 c/o 1.5 weeks of exertional dyspnea, productive cough, audible wheezing, and mild headache. Pt was seen outpatient and diagnosed with human metapneumovirus. He was started on budesonide nebulizer by an outpatient provider with temporary relief of symptoms. He came to the ED due to persistent symptoms. On admission, patient was hypoxic with an oxygen saturation of 86% on room air. He was placed on nasal cannula and showed improvement.     Chest X-ray on 7/5 was negative. CT Chest on 7/5 showed b/l lower lobe bronchial wall thickening with areas of mucoid impaction and, to a lesser degree, in the right middle lobe. Incidental findings of b/l non-obstructing renal calculi and a 3.3 cm indeterminate left adrenal gland nodule.     INTERVAL HPI/OVERNIGHT EVENTS:  - No acute overnight events  - Patient is comfortable    TODAY:  - Patient examined bedside, comfortable, no complaints  - Patient denies CP, SOB, fever, nausea, vomiting  - Patient states that he does not feel a difference with or without the nasal cannula  - Patient is occasionally coughing, productive with light yellow phlegm     MEDICATIONS  (STANDING):  albuterol/ipratropium for Nebulization 3 milliLiter(s) Nebulizer every 6 hours  amLODIPine   Tablet 2.5 milliGRAM(s) Oral daily  azithromycin  IVPB 500 milliGRAM(s) IV Intermittent every 24 hours  enoxaparin Injectable 40 milliGRAM(s) SubCutaneous every 24 hours  guaiFENesin ER 1200 milliGRAM(s) Oral every 12 hours  losartan 100 milliGRAM(s) Oral daily  methylPREDNISolone sodium succinate Injectable 40 milliGRAM(s) IV Push every 8 hours  pantoprazole    Tablet 40 milliGRAM(s) Oral before breakfast  rosuvastatin 20 milliGRAM(s) Oral at bedtime    MEDICATIONS  (PRN):  acetaminophen     Tablet .. 650 milliGRAM(s) Oral every 6 hours PRN Temp greater or equal to 38C (100.4F), Mild Pain (1 - 3), Moderate Pain (4 - 6)  aluminum hydroxide/magnesium hydroxide/simethicone Suspension 30 milliLiter(s) Oral every 4 hours PRN Dyspepsia  guaiFENesin Oral Liquid (Sugar-Free) 100 milliGRAM(s) Oral every 6 hours PRN Cough  hydrocodone/homatropine Syrup 5 milliLiter(s) Oral every 6 hours PRN Cough  melatonin 3 milliGRAM(s) Oral at bedtime PRN Insomnia  ondansetron Injectable 4 milliGRAM(s) IV Push every 6 hours PRN Nausea and/or Vomiting      Allergies    venlafaxine (Other)    Intolerances        REVIEW OF SYSTEMS:  as above      Vital Signs Last 24 Hrs  T(C): 36.3 (06 Jul 2025 07:14), Max: 37.1 (05 Jul 2025 15:14)  T(F): 97.3 (06 Jul 2025 07:14), Max: 98.8 (05 Jul 2025 22:50)  HR: 101 (06 Jul 2025 09:37) (95 - 119)  BP: 123/77 (06 Jul 2025 07:14) (101/60 - 164/93)  BP(mean): 102 (05 Jul 2025 23:14) (102 - 106)  RR: 19 (06 Jul 2025 09:37) (16 - 23)  SpO2: 94% (06 Jul 2025 09:37) (86% - 95%)    Parameters below as of 06 Jul 2025 09:37  Patient On (Oxygen Delivery Method): nasal cannula  O2 Flow (L/min): 3      PHYSICAL EXAM:  GENERAL: Not in acute distress, lying comfortably  HEAD:  Atraumatic, Normocephalic  EYES: EOMI, PERRLA, conjunctiva and sclera clear  NECK: Supple, No JVD, Normal thyroid  NERVOUS SYSTEM:  Alert & Oriented X3, No gross focal deficits  CHEST/LUNG: Wheezing bilaterally, primarily in lower lobes; No rales, rhonchi, or rubs  HEART: Regular rate and rhythm; No murmurs, rubs, or gallops  ABDOMEN: Soft, Nontender, Nondistended; Bowel sounds present  EXTREMITIES:  No clubbing, cyanosis, or edema  SKIN: No rashes or lesions    LABS:                        15.5   10.21 )-----------( 272      ( 06 Jul 2025 02:00 )             44.7     07-06    139  |  104  |  18.1  ----------------------------<  142[H]  3.9   |  20.0[L]  |  0.74    Ca    9.2      06 Jul 2025 02:00    TPro  7.2  /  Alb  4.5  /  TBili  0.6  /  DBili  x   /  AST  19  /  ALT  30  /  AlkPhos  55  07-06      Urinalysis Basic - ( 06 Jul 2025 02:00 )    Color: x / Appearance: x / SG: x / pH: x  Gluc: 142 mg/dL / Ketone: x  / Bili: x / Urobili: x   Blood: x / Protein: x / Nitrite: x   Leuk Esterase: x / RBC: x / WBC x   Sq Epi: x / Non Sq Epi: x / Bacteria: x      CAPILLARY BLOOD GLUCOSE          RADIOLOGY & ADDITIONAL TESTS:    Imaging Personally Reviewed:  [X] YES  [ ] NO    Consultant(s) Notes Reviewed:  [X] YES  [ ] NO    Care Discussed with Consultants/Other Providers [X] YES  [ ] NO    Plan of Care discussed with House Staff: [X]YES [ ] NO Patient is a 65y old male who presents with a chief complaint of acute hypoxemic respiratory failure and acute bronchitis post-viral infection (05 Jul 2025 22:47)    BRIEF HOSPITAL COURSE:  Patient is a 65 year old male with a PMHx of former smoker, early stage bladder cancer, HTN, and HLD. Pt presented to the ED on 7/5 c/o 1.5 weeks of exertional dyspnea, productive cough, audible wheezing, and mild headache. Pt was seen outpatient and diagnosed with human metapneumovirus. He was started on budesonide nebulizer by an outpatient provider with temporary relief of symptoms. He came to the ED due to persistent symptoms. On admission, patient was hypoxic with an oxygen saturation of 86% on room air. He was placed on nasal cannula and showed improvement.  CT Chest (7/5) showed b/l lower lobe bronchial wall thickening w/ mucoid impaction. Incidental findings of 3.3 cm indeterminate left adrenal gland nodule (not new). Pt started on azithro, duonebs, and steroids.    INTERVAL HPI/OVERNIGHT EVENTS:  - No acute overnight events  - Patient is comfortable    TODAY:  - Patient examined bedside, comfortable, no complaints  - Patient denies CP, SOB, fever, nausea, vomiting  - Patient states that he does not feel a difference with or without the nasal cannula  - Patient is occasionally coughing, productive with light yellow phlegm. Notes that cough is improved    MEDICATIONS  (STANDING):  albuterol/ipratropium for Nebulization 3 milliLiter(s) Nebulizer every 6 hours  amLODIPine   Tablet 2.5 milliGRAM(s) Oral daily  azithromycin  IVPB 500 milliGRAM(s) IV Intermittent every 24 hours  enoxaparin Injectable 40 milliGRAM(s) SubCutaneous every 24 hours  guaiFENesin ER 1200 milliGRAM(s) Oral every 12 hours  losartan 100 milliGRAM(s) Oral daily  methylPREDNISolone sodium succinate Injectable 40 milliGRAM(s) IV Push every 8 hours  pantoprazole    Tablet 40 milliGRAM(s) Oral before breakfast  rosuvastatin 20 milliGRAM(s) Oral at bedtime    MEDICATIONS  (PRN):  acetaminophen     Tablet .. 650 milliGRAM(s) Oral every 6 hours PRN Temp greater or equal to 38C (100.4F), Mild Pain (1 - 3), Moderate Pain (4 - 6)  aluminum hydroxide/magnesium hydroxide/simethicone Suspension 30 milliLiter(s) Oral every 4 hours PRN Dyspepsia  guaiFENesin Oral Liquid (Sugar-Free) 100 milliGRAM(s) Oral every 6 hours PRN Cough  hydrocodone/homatropine Syrup 5 milliLiter(s) Oral every 6 hours PRN Cough  melatonin 3 milliGRAM(s) Oral at bedtime PRN Insomnia  ondansetron Injectable 4 milliGRAM(s) IV Push every 6 hours PRN Nausea and/or Vomiting      Allergies    venlafaxine (Other)    Intolerances        REVIEW OF SYSTEMS:  as above      Vital Signs Last 24 Hrs  T(C): 36.3 (06 Jul 2025 07:14), Max: 37.1 (05 Jul 2025 15:14)  T(F): 97.3 (06 Jul 2025 07:14), Max: 98.8 (05 Jul 2025 22:50)  HR: 101 (06 Jul 2025 09:37) (95 - 119)  BP: 123/77 (06 Jul 2025 07:14) (101/60 - 164/93)  BP(mean): 102 (05 Jul 2025 23:14) (102 - 106)  RR: 19 (06 Jul 2025 09:37) (16 - 23)  SpO2: 94% (06 Jul 2025 09:37) (86% - 95%)    Parameters below as of 06 Jul 2025 09:37  Patient On (Oxygen Delivery Method): nasal cannula  O2 Flow (L/min): 3      PHYSICAL EXAM:  GENERAL: Not in acute distress, lying comfortably  HEAD:  Atraumatic, Normocephalic  EYES: EOMI, PERRLA, conjunctiva and sclera clear  NECK: Supple, No JVD, Normal thyroid  NERVOUS SYSTEM:  Alert & Oriented X3, No gross focal deficits  CHEST/LUNG: Wheezing bilaterally, primarily in lower lobes; No rales, rhonchi, or rubs. NC in place  HEART: Regular rate and rhythm; No murmurs, rubs, or gallops  ABDOMEN: Soft, Nontender, Nondistended; Bowel sounds present  EXTREMITIES:  No clubbing, cyanosis, or edema  SKIN: No rashes or lesions    LABS:                        15.5   10.21 )-----------( 272      ( 06 Jul 2025 02:00 )             44.7     07-06    139  |  104  |  18.1  ----------------------------<  142[H]  3.9   |  20.0[L]  |  0.74    Ca    9.2      06 Jul 2025 02:00    TPro  7.2  /  Alb  4.5  /  TBili  0.6  /  DBili  x   /  AST  19  /  ALT  30  /  AlkPhos  55  07-06      Urinalysis Basic - ( 06 Jul 2025 02:00 )    Color: x / Appearance: x / SG: x / pH: x  Gluc: 142 mg/dL / Ketone: x  / Bili: x / Urobili: x   Blood: x / Protein: x / Nitrite: x   Leuk Esterase: x / RBC: x / WBC x   Sq Epi: x / Non Sq Epi: x / Bacteria: x      CAPILLARY BLOOD GLUCOSE          RADIOLOGY & ADDITIONAL TESTS:    Imaging Personally Reviewed:  [X] YES  [ ] NO    Consultant(s) Notes Reviewed:  [] YES  [ ] NO    Care Discussed with Consultants/Other Providers [] YES  [ ] NO    Plan of Care discussed with House Staff: [X]YES [ ] NO

## 2025-07-06 NOTE — PROGRESS NOTE ADULT - TIME BILLING
During the encounter, significant time was dedicated to reviewing chart documentation, analyzing labs and imaging studies, evaluating the patient, coordinating with the medical team and/or consultants concerning the plan of care, and completing documentation. This time encompasses direct care activities while distinguishing from any separately reported procedure and time spent teaching

## 2025-07-06 NOTE — PROGRESS NOTE ADULT - ASSESSMENT
65yoM hx former smoker, early stage bladder cancer, HTN, HLD presenting with several days of exertional dyspnea, productive cough and audible wheezing after he was diagnosed with recent hMPV as outpatient     Acute hypoxemic respiratory failure  Suspected bronchitis from recent viral infection +/- probable COPD exacerbation   -CT chest 7/5: reviewed, b/l lower lobe bronchial wall thickening with areas of mucoid impaction and, to a lesser degree, in the right middle lobe, no consolidation  -Chest X-ray 7/5: negative  -s/p ceftriaxone, azithromycin by ED; discontinue ceftriaxone 7/6  -Continue IV steroids and standing DuoNebs  - Inflammatory markers negative  -Respiratory viral panel, MRSA, and COVID negative  -Pending urine Legionella, strep, blood culture results  -Continue 3L oxygen nasal cannula, wean supplemental O2 as tolerated   -Begin incentive spirometry   -Needs outpatient pulmonary follow up for PFTs    Hx HTN, HLD  -Continue amlodipine and losartan  -Continue rosuvastatin     Hx bladder cancer  -Reports ‘stage 0’ bladder cancer, on surveillance with cautery of lesions  -Outpatient urology follow up    Hx adrenal lesions  -Incidental finding on CT chest but pt states has been following for 4-5 years as outpatient    Hx nephrolithiasis  -Bilateral non-obstructing renal calculi on CT, pt aware, follows w/ urologist     Hx obesity  -On Zepbound weekly for weight loss, hold in hospital     Prophylactic measure  -Lovenox 40mg daily (higher risk due to malignancy)        Dispo: medically active, pending clinical improvement, wean off supplemental O2.  Estimated LOS: 2-3 days, likely d/c to home. 66 y/o male PMhx former smoker, early stage bladder cancer, HTN, HLD presenting with 1.5 week history of exertional dyspnea, productive cough and audible wheezing after recent diagnosis of hMPV.    Acute hypoxemic respiratory failure  Suspected bronchitis from recent viral infection  -CT chest 7/5: b/l lower lobe bronchial wall thickening with areas of mucoid impaction and in the right middle lobe, no consolidation  -Chest X-ray 7/5: negative  -s/p ceftriaxone 1000 mg QD, azithromycin 500 mg QD by ED; discontinued ceftriaxone 1000 mg 7/6  -Continue IV methylprednisolone 40 mg TID and standing DuoNebs 3 mL Q 6 hrs  - Inflammatory markers negative  -Respiratory viral panel, MRSA, and COVID negative  -Pending urine Legionella, strep, blood culture results  -Continue 3L oxygen nasal cannula, wean supplemental O2 as tolerated   -Begin incentive spirometry   -Needs outpatient pulmonary follow up for PFTs    Hx HTN, HLD  -Continue amlodipine 2.5 mg QD and losartan 100 mg QD  -Continue rosuvastatin 20 mg QD    Hx bladder cancer  -Reports ‘stage 0’ bladder cancer, on surveillance with cautery of lesions  -Outpatient urology follow up    Hx adrenal lesions  -Incidental finding on CT chest but pt states has been following for 4-5 years as outpatient    Hx nephrolithiasis  -Bilateral non-obstructing renal calculi on CT, pt aware, follows w/ urologist     Hx obesity  -On Zepbound weekly for weight loss, hold in hospital     Prophylactic measure  -Lovenox 40mg daily       Dispo: medically active, pending clinical improvement, wean off supplemental O2.  Estimated LOS: 2-3 days, likely d/c to home.   66 y/o male PMhx former smoker, early stage bladder cancer, HTN, HLD admitted with AHRF 2/2 post viral bronchitis    #Acute hypoxemic respiratory failure likely 2/2 post viral bronchitis  -diagnosed w/ hmpv outpt ~2 weeks ago  -CT chest 7/5: b/l lower lobe bronchial wall thickening w/ mucoid impaction  -RVP negative  -d/caitlin rocephin since low suspicion for PNA  -azithromycin 500 mg QD  -Continue IV methylprednisolone 40 mg q8hrs  -continue standing DuoNebs q6hrs  -continue mucinex 1200mg BID  -continue robitussin 100mg q6hrs PRN and hycodan 5ml q6hrs PRN for cough. Advised pt to try and use only for uncontrolled fits, otherwise continue to cough out phlegm  -f/u sputum cx  -f/u bcx  -Continue 3L oxygen nasal cannula, wean supplemental O2 as tolerated   -Begin incentive spirometry   -Needs outpatient pulmonary follow up for PFTs    Hx HTN, HLD  -Continue amlodipine 2.5 mg QD and losartan 100 mg QD  -Continue rosuvastatin 20 mg QD    Hx bladder cancer  -Reports ‘stage 0’ bladder cancer, on surveillance with cautery of lesions  -Outpatient urology follow up    Hx adrenal lesions  -Incidental finding on CT chest but pt states has been following for 4-5 years as outpatient    Hx nephrolithiasis  -Bilateral non-obstructing renal calculi on CT, pt aware, follows w/ urologist     Hx obesity  -On Zepbound weekly for weight loss, hold in hospital     DVT PPX: lovenox  Dispo: likely 24-48 hrs pending improvement in respiratory status

## 2025-07-07 ENCOUNTER — TRANSCRIPTION ENCOUNTER (OUTPATIENT)
Age: 65
End: 2025-07-07

## 2025-07-07 VITALS
TEMPERATURE: 98 F | SYSTOLIC BLOOD PRESSURE: 110 MMHG | OXYGEN SATURATION: 92 % | DIASTOLIC BLOOD PRESSURE: 62 MMHG | RESPIRATION RATE: 18 BRPM | HEART RATE: 76 BPM

## 2025-07-07 LAB
ALBUMIN SERPL ELPH-MCNC: 4.1 G/DL — SIGNIFICANT CHANGE UP (ref 3.3–5.2)
ALP SERPL-CCNC: 55 U/L — SIGNIFICANT CHANGE UP (ref 40–120)
ALT FLD-CCNC: 26 U/L — SIGNIFICANT CHANGE UP
ANION GAP SERPL CALC-SCNC: 12 MMOL/L — SIGNIFICANT CHANGE UP (ref 5–17)
AST SERPL-CCNC: 21 U/L — SIGNIFICANT CHANGE UP
BASOPHILS # BLD AUTO: 0.02 K/UL — SIGNIFICANT CHANGE UP (ref 0–0.2)
BASOPHILS NFR BLD AUTO: 0.1 % — SIGNIFICANT CHANGE UP (ref 0–2)
BILIRUB SERPL-MCNC: 0.5 MG/DL — SIGNIFICANT CHANGE UP (ref 0.4–2)
BUN SERPL-MCNC: 22 MG/DL — HIGH (ref 8–20)
CALCIUM SERPL-MCNC: 9.2 MG/DL — SIGNIFICANT CHANGE UP (ref 8.4–10.5)
CHLORIDE SERPL-SCNC: 105 MMOL/L — SIGNIFICANT CHANGE UP (ref 96–108)
CO2 SERPL-SCNC: 23 MMOL/L — SIGNIFICANT CHANGE UP (ref 22–29)
CREAT SERPL-MCNC: 0.71 MG/DL — SIGNIFICANT CHANGE UP (ref 0.5–1.3)
CULTURE RESULTS: ABNORMAL
EGFR: 103 ML/MIN/1.73M2 — SIGNIFICANT CHANGE UP
EGFR: 103 ML/MIN/1.73M2 — SIGNIFICANT CHANGE UP
EOSINOPHIL # BLD AUTO: 0 K/UL — SIGNIFICANT CHANGE UP (ref 0–0.5)
EOSINOPHIL NFR BLD AUTO: 0 % — SIGNIFICANT CHANGE UP (ref 0–6)
GLUCOSE SERPL-MCNC: 149 MG/DL — HIGH (ref 70–99)
HCT VFR BLD CALC: 43.7 % — SIGNIFICANT CHANGE UP (ref 39–50)
HGB BLD-MCNC: 14.9 G/DL — SIGNIFICANT CHANGE UP (ref 13–17)
IMM GRANULOCYTES # BLD AUTO: 0.15 K/UL — HIGH (ref 0–0.07)
IMM GRANULOCYTES NFR BLD AUTO: 0.9 % — SIGNIFICANT CHANGE UP (ref 0–0.9)
LYMPHOCYTES # BLD AUTO: 1.2 K/UL — SIGNIFICANT CHANGE UP (ref 1–3.3)
LYMPHOCYTES NFR BLD AUTO: 7.3 % — LOW (ref 13–44)
MAGNESIUM SERPL-MCNC: 2.2 MG/DL — SIGNIFICANT CHANGE UP (ref 1.6–2.6)
MCHC RBC-ENTMCNC: 32 PG — SIGNIFICANT CHANGE UP (ref 27–34)
MCHC RBC-ENTMCNC: 34.1 G/DL — SIGNIFICANT CHANGE UP (ref 32–36)
MCV RBC AUTO: 94 FL — SIGNIFICANT CHANGE UP (ref 80–100)
MONOCYTES # BLD AUTO: 0.6 K/UL — SIGNIFICANT CHANGE UP (ref 0–0.9)
MONOCYTES NFR BLD AUTO: 3.6 % — SIGNIFICANT CHANGE UP (ref 2–14)
NEUTROPHILS # BLD AUTO: 14.52 K/UL — HIGH (ref 1.8–7.4)
NEUTROPHILS NFR BLD AUTO: 88.1 % — HIGH (ref 43–77)
NRBC # BLD AUTO: 0 K/UL — SIGNIFICANT CHANGE UP (ref 0–0)
NRBC # FLD: 0 K/UL — SIGNIFICANT CHANGE UP (ref 0–0)
NRBC BLD AUTO-RTO: 0 /100 WBCS — SIGNIFICANT CHANGE UP (ref 0–0)
PHOSPHATE SERPL-MCNC: 4.1 MG/DL — SIGNIFICANT CHANGE UP (ref 2.4–4.7)
PLATELET # BLD AUTO: 279 K/UL — SIGNIFICANT CHANGE UP (ref 150–400)
PMV BLD: 10.1 FL — SIGNIFICANT CHANGE UP (ref 7–13)
POTASSIUM SERPL-MCNC: 4.3 MMOL/L — SIGNIFICANT CHANGE UP (ref 3.5–5.3)
POTASSIUM SERPL-SCNC: 4.3 MMOL/L — SIGNIFICANT CHANGE UP (ref 3.5–5.3)
PROT SERPL-MCNC: 6.7 G/DL — SIGNIFICANT CHANGE UP (ref 6.6–8.7)
RBC # BLD: 4.65 M/UL — SIGNIFICANT CHANGE UP (ref 4.2–5.8)
RBC # FLD: 13 % — SIGNIFICANT CHANGE UP (ref 10.3–14.5)
SODIUM SERPL-SCNC: 140 MMOL/L — SIGNIFICANT CHANGE UP (ref 135–145)
SPECIMEN SOURCE: SIGNIFICANT CHANGE UP
WBC # BLD: 16.49 K/UL — HIGH (ref 3.8–10.5)
WBC # FLD AUTO: 16.49 K/UL — HIGH (ref 3.8–10.5)

## 2025-07-07 PROCEDURE — 94760 N-INVAS EAR/PLS OXIMETRY 1: CPT

## 2025-07-07 PROCEDURE — 96374 THER/PROPH/DIAG INJ IV PUSH: CPT

## 2025-07-07 PROCEDURE — 87899 AGENT NOS ASSAY W/OPTIC: CPT

## 2025-07-07 PROCEDURE — 83605 ASSAY OF LACTIC ACID: CPT

## 2025-07-07 PROCEDURE — 83735 ASSAY OF MAGNESIUM: CPT

## 2025-07-07 PROCEDURE — 83880 ASSAY OF NATRIURETIC PEPTIDE: CPT

## 2025-07-07 PROCEDURE — 71046 X-RAY EXAM CHEST 2 VIEWS: CPT

## 2025-07-07 PROCEDURE — 84295 ASSAY OF SERUM SODIUM: CPT

## 2025-07-07 PROCEDURE — 87641 MR-STAPH DNA AMP PROBE: CPT

## 2025-07-07 PROCEDURE — 84145 PROCALCITONIN (PCT): CPT

## 2025-07-07 PROCEDURE — 0225U NFCT DS DNA&RNA 21 SARSCOV2: CPT

## 2025-07-07 PROCEDURE — 87040 BLOOD CULTURE FOR BACTERIA: CPT

## 2025-07-07 PROCEDURE — 99239 HOSP IP/OBS DSCHRG MGMT >30: CPT

## 2025-07-07 PROCEDURE — 82435 ASSAY OF BLOOD CHLORIDE: CPT

## 2025-07-07 PROCEDURE — 85014 HEMATOCRIT: CPT

## 2025-07-07 PROCEDURE — 82947 ASSAY GLUCOSE BLOOD QUANT: CPT

## 2025-07-07 PROCEDURE — 84484 ASSAY OF TROPONIN QUANT: CPT

## 2025-07-07 PROCEDURE — 80053 COMPREHEN METABOLIC PANEL: CPT

## 2025-07-07 PROCEDURE — 36415 COLL VENOUS BLD VENIPUNCTURE: CPT

## 2025-07-07 PROCEDURE — 82330 ASSAY OF CALCIUM: CPT

## 2025-07-07 PROCEDURE — 94640 AIRWAY INHALATION TREATMENT: CPT

## 2025-07-07 PROCEDURE — 86140 C-REACTIVE PROTEIN: CPT

## 2025-07-07 PROCEDURE — 84100 ASSAY OF PHOSPHORUS: CPT

## 2025-07-07 PROCEDURE — 85025 COMPLETE CBC W/AUTO DIFF WBC: CPT

## 2025-07-07 PROCEDURE — 87070 CULTURE OTHR SPECIMN AEROBIC: CPT

## 2025-07-07 PROCEDURE — 85027 COMPLETE CBC AUTOMATED: CPT

## 2025-07-07 PROCEDURE — 71275 CT ANGIOGRAPHY CHEST: CPT

## 2025-07-07 PROCEDURE — 84132 ASSAY OF SERUM POTASSIUM: CPT

## 2025-07-07 PROCEDURE — 82803 BLOOD GASES ANY COMBINATION: CPT

## 2025-07-07 PROCEDURE — 85018 HEMOGLOBIN: CPT

## 2025-07-07 PROCEDURE — 87205 SMEAR GRAM STAIN: CPT

## 2025-07-07 PROCEDURE — 99285 EMERGENCY DEPT VISIT HI MDM: CPT | Mod: 25

## 2025-07-07 PROCEDURE — 87640 STAPH A DNA AMP PROBE: CPT

## 2025-07-07 PROCEDURE — 96375 TX/PRO/DX INJ NEW DRUG ADDON: CPT

## 2025-07-07 PROCEDURE — 85379 FIBRIN DEGRADATION QUANT: CPT

## 2025-07-07 RX ORDER — ROSUVASTATIN CALCIUM 20 MG/1
1 TABLET, FILM COATED ORAL
Refills: 0 | DISCHARGE

## 2025-07-07 RX ORDER — DEXTROMETHORPHAN HBR, GUAIFENESIN 200 MG/10ML
1 LIQUID ORAL
Qty: 8 | Refills: 0
Start: 2025-07-07 | End: 2025-07-10

## 2025-07-07 RX ORDER — PREDNISONE 20 MG/1
2 TABLET ORAL
Qty: 6 | Refills: 0
Start: 2025-07-07 | End: 2025-07-09

## 2025-07-07 RX ADMIN — METHYLPREDNISOLONE ACETATE 40 MILLIGRAM(S): 80 INJECTION, SUSPENSION INTRA-ARTICULAR; INTRALESIONAL; INTRAMUSCULAR; SOFT TISSUE at 05:18

## 2025-07-07 RX ADMIN — METHYLPREDNISOLONE ACETATE 40 MILLIGRAM(S): 80 INJECTION, SUSPENSION INTRA-ARTICULAR; INTRALESIONAL; INTRAMUSCULAR; SOFT TISSUE at 13:37

## 2025-07-07 RX ADMIN — DEXTROMETHORPHAN HBR, GUAIFENESIN 1200 MILLIGRAM(S): 200 LIQUID ORAL at 05:18

## 2025-07-07 RX ADMIN — AMLODIPINE BESYLATE 2.5 MILLIGRAM(S): 10 TABLET ORAL at 05:18

## 2025-07-07 RX ADMIN — Medication 250 MILLIGRAM(S): at 05:17

## 2025-07-07 RX ADMIN — IPRATROPIUM BROMIDE AND ALBUTEROL SULFATE 3 MILLILITER(S): .5; 2.5 SOLUTION RESPIRATORY (INHALATION) at 08:37

## 2025-07-07 RX ADMIN — ENOXAPARIN SODIUM 40 MILLIGRAM(S): 100 INJECTION SUBCUTANEOUS at 05:17

## 2025-07-07 RX ADMIN — Medication 40 MILLIGRAM(S): at 05:18

## 2025-07-07 RX ADMIN — LOSARTAN POTASSIUM 100 MILLIGRAM(S): 100 TABLET, FILM COATED ORAL at 05:18

## 2025-07-07 RX ADMIN — IPRATROPIUM BROMIDE AND ALBUTEROL SULFATE 3 MILLILITER(S): .5; 2.5 SOLUTION RESPIRATORY (INHALATION) at 14:36

## 2025-07-07 NOTE — DISCHARGE NOTE PROVIDER - HOSPITAL COURSE
Patient is a 65 year old male with a PMHx of former smoker, early stage bladder cancer, HTN, and HLD. Pt presented to the ED on 7/5 c/o 1.5 weeks of exertional dyspnea, productive cough, audible wheezing, and mild headache. Pt was seen outpatient and diagnosed with human metapneumovirus. He was started on budesonide nebulizer by an outpatient provider with temporary relief of symptoms. He came to the ED due to persistent symptoms. On admission, patient was hypoxic with an oxygen saturation of 86% on room air. He was placed on nasal cannula and showed improvement.  CT Chest (7/5) showed b/l lower lobe bronchial wall thickening w/ mucoid impaction. Incidental findings of 3.3 cm indeterminate left adrenal gland nodule (not new). Pt started on azithro, duonebs, and steroids. Patient was taken off of supplemental oxygen on 7/7. A home oxygen evaluation was completed and showed an oxygen saturation  with an oxygen saturation of 98% at rest at 93% upon exertion.     Discharge Diagnosis:   #Acute hypoxemic respiratory failure likely 2/2 post viral bronchitis  #Hx HTN, HLD  #Hx bladder cancer  #Hx adrenal lesions  #Hx nephrolithiasis  #Hx obesity Patient is a 65 year old male with a PMHx of former smoker, early stage bladder cancer, HTN, and HLD. Pt presented to the ED on 7/5 c/o 1.5 weeks of exertional dyspnea, productive cough, audible wheezing, and mild headache. Pt was seen outpatient and diagnosed with human metapneumovirus. He was started on budesonide nebulizer by an outpatient provider with temporary relief of symptoms. He came to the ED due to persistent symptoms. On admission, patient was hypoxic with an oxygen saturation of 86% on room air. He was placed on nasal cannula and showed improvement.  CT Chest (7/5) showed b/l lower lobe bronchial wall thickening w/ mucoid impaction. Incidental findings of 3.3 cm indeterminate left adrenal gland nodule (not new, already following outpt). Pt started on azithro, duonebs, and steroids. Patient was taken off of supplemental oxygen on 7/7. A home oxygen evaluation was completed and showed an oxygen saturation  with an oxygen saturation of 98% at rest at 93% upon exertion.    Discharge Diagnoses:  #Acute hypoxemic respiratory failure likely 2/2 post viral bronchitis  #Hx HTN, HLD  #Hx bladder cancer  #Hx adrenal lesions  #Hx nephrolithiasis  #Hx obesity    Patient was explained hospital course, risks and benefits of treatment, and discharge planning, along with follow-up. Patient expresses understanding of all of the above. Patient is medically stable for discharge with appropriate followup.

## 2025-07-07 NOTE — DISCHARGE NOTE NURSING/CASE MANAGEMENT/SOCIAL WORK - PATIENT PORTAL LINK FT
You can access the FollowMyHealth Patient Portal offered by [facility] by registering at the following website: [email or web address]. By joining [employer]’s FollowMyHealth portal, you will also be able to view your health information using other applications (apps) compatible with our system.
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
## 2025-07-07 NOTE — DISCHARGE NOTE PROVIDER - ATTENDING DISCHARGE PHYSICAL EXAMINATION:
Vital Signs Last 24 Hrs  T(C): 36.3 (07 Jul 2025 08:28), Max: 37.1 (06 Jul 2025 17:45)  T(F): 97.4 (07 Jul 2025 08:28), Max: 98.8 (06 Jul 2025 17:45)  HR: 96 (07 Jul 2025 08:37) (89 - 105)  BP: 108/70 (07 Jul 2025 08:28) (103/62 - 130/77)  BP(mean): --  RR: 18 (07 Jul 2025 08:28) (18 - 18)  SpO2: 96% (07 Jul 2025 08:37) (91% - 96%)    Parameters below as of 07 Jul 2025 08:37  Patient On (Oxygen Delivery Method): nasal cannula    PHYSICAL EXAM:  GENERAL: Not in acute distress, lying comfortably  HEAD:  Atraumatic, Normocephalic  EYES: EOMI, PERRLA, conjunctiva and sclera clear  NECK: Supple, No JVD, Normal thyroid  NERVOUS SYSTEM:  Alert & Oriented X3, No gross focal deficits  CHEST/LUNG: mild b/l inspiratory wheezing; No rales, rhonchi, or rubs.  HEART: Regular rate and rhythm; No murmurs, rubs, or gallops  ABDOMEN: Soft, Nontender, Nondistended; Bowel sounds present  EXTREMITIES:  No clubbing, cyanosis, or edema  SKIN: No rashes or lesions

## 2025-07-07 NOTE — DISCHARGE NOTE PROVIDER - NSDCCPCAREPLAN_GEN_ALL_CORE_FT
PRINCIPAL DISCHARGE DIAGNOSIS  Diagnosis: Acute hypoxic respiratory failure  Assessment and Plan of Treatment: -Resolved   -Follow up with primary care provider within 1 week         SECONDARY DISCHARGE DIAGNOSES  Diagnosis: Acute bronchitis  Assessment and Plan of Treatment: -Ongoing   -Follow up with primary care provider within 1 week    Diagnosis: Hypertension  Assessment and Plan of Treatment: -Ongoing   -Continue amlodipine and losartan as prescribed  -Follow up with primary care provider within 1 week    Diagnosis: Hyperlipidemia  Assessment and Plan of Treatment: -Ongoing   -Continue rosuvastatin as prescribed   -Follow up with primary care provider within 1 week    Diagnosis: Bladder cancer  Assessment and Plan of Treatment: -Ongoing   -Follow up with primary care provider within 1 week    Diagnosis: Lesion of adrenal gland  Assessment and Plan of Treatment: -Ongoing   -Follow up with primary care provider within 1 week    Diagnosis: Nephrolithiasis  Assessment and Plan of Treatment: -Ongoing   -Follow up with urologist within 1 week    Diagnosis: Obesity  Assessment and Plan of Treatment: -Ongoing   -Do not restart Zepbound  -Follow up with primary care provider within 1 week     PRINCIPAL DISCHARGE DIAGNOSIS  Diagnosis: Acute hypoxic respiratory failure  Assessment and Plan of Treatment: Treated with steroids, mucinex, supplemental oxygen that has now been weaned off  -Resolved  -Please complete steroid course as prescribed and continue to use mucinex to help you get rid of the mucus build up in your lungs.  -Follow up with primary care provider within 1 week         SECONDARY DISCHARGE DIAGNOSES  Diagnosis: Acute bronchitis  Assessment and Plan of Treatment: -Ongoing   -Follow up with primary care provider within 1 week    Diagnosis: Hypertension  Assessment and Plan of Treatment: -Ongoing   -Continue amlodipine and olmesartan  as prescribed  -Follow up with primary care provider within 1 week    Diagnosis: Hyperlipidemia  Assessment and Plan of Treatment: -Ongoing   -Continue rosuvastatin as prescribed   -Follow up with primary care provider within 1 week    Diagnosis: Bladder cancer  Assessment and Plan of Treatment: -Ongoing   -Follow up with primary care provider within 1 week    Diagnosis: Lesion of adrenal gland  Assessment and Plan of Treatment: -Ongoing   -Follow up with primary care provider within 1 week    Diagnosis: Nephrolithiasis  Assessment and Plan of Treatment: -Ongoing   -Follow up with urologist within 1 week    Diagnosis: Obesity  Assessment and Plan of Treatment: -Ongoing   -Do not restart Zepbound  -Follow up with primary care provider within 1 week

## 2025-07-07 NOTE — DISCHARGE NOTE PROVIDER - NSDCMRMEDTOKEN_GEN_ALL_CORE_FT
amLODIPine 2.5 mg oral tablet: 1 tab(s) orally once a day  budesonide 0.5 mg/2 mL inhalation suspension: 2 milliliter(s) by nebulizer 2 times a day as needed for  shortness of breath and/or wheezing  olmesartan 40 mg oral tablet: 1 tab(s) orally once a day  pantoprazole 40 mg oral delayed release tablet: 1 tab(s) orally once a day  rosuvastatin 20 mg oral tablet: 1 tab(s) orally once a day  Zepbound 5 mg/0.5 mL subcutaneous solution: 5 milligram(s) subcutaneously once a week on Fridays   amLODIPine 2.5 mg oral tablet: 1 tab(s) orally once a day  budesonide 0.5 mg/2 mL inhalation suspension: 2 milliliter(s) by nebulizer 2 times a day as needed for  shortness of breath and/or wheezing  guaiFENesin 1200 mg oral tablet, extended release: 1 tab(s) orally every 12 hours  olmesartan 40 mg oral tablet: 1 tab(s) orally once a day  pantoprazole 40 mg oral delayed release tablet: 1 tab(s) orally once a day  predniSONE 20 mg oral tablet: 2 tab(s) orally  Zepbound 5 mg/0.5 mL subcutaneous solution: 5 milligram(s) subcutaneously once a week on Fridays

## 2025-07-11 LAB
CULTURE RESULTS: SIGNIFICANT CHANGE UP
CULTURE RESULTS: SIGNIFICANT CHANGE UP
SPECIMEN SOURCE: SIGNIFICANT CHANGE UP
SPECIMEN SOURCE: SIGNIFICANT CHANGE UP

## 2025-07-24 ENCOUNTER — APPOINTMENT (OUTPATIENT)
Dept: DERMATOLOGY | Facility: CLINIC | Age: 65
End: 2025-07-24
Payer: COMMERCIAL

## 2025-07-24 PROCEDURE — 99213 OFFICE O/P EST LOW 20 MIN: CPT
